# Patient Record
Sex: MALE | Race: WHITE | NOT HISPANIC OR LATINO | Employment: FULL TIME | URBAN - METROPOLITAN AREA
[De-identification: names, ages, dates, MRNs, and addresses within clinical notes are randomized per-mention and may not be internally consistent; named-entity substitution may affect disease eponyms.]

---

## 2019-07-21 ENCOUNTER — OFFICE VISIT (OUTPATIENT)
Dept: URGENT CARE | Facility: CLINIC | Age: 49
End: 2019-07-21
Payer: COMMERCIAL

## 2019-07-21 ENCOUNTER — HOSPITAL ENCOUNTER (EMERGENCY)
Facility: MEDICAL CENTER | Age: 49
End: 2019-07-21
Attending: EMERGENCY MEDICINE
Payer: COMMERCIAL

## 2019-07-21 VITALS
OXYGEN SATURATION: 97 % | RESPIRATION RATE: 16 BRPM | TEMPERATURE: 97 F | BODY MASS INDEX: 24.55 KG/M2 | SYSTOLIC BLOOD PRESSURE: 114 MMHG | HEART RATE: 65 BPM | HEIGHT: 68 IN | WEIGHT: 162 LBS | DIASTOLIC BLOOD PRESSURE: 84 MMHG

## 2019-07-21 VITALS
DIASTOLIC BLOOD PRESSURE: 74 MMHG | HEIGHT: 68 IN | WEIGHT: 163.58 LBS | OXYGEN SATURATION: 98 % | TEMPERATURE: 98.3 F | BODY MASS INDEX: 24.79 KG/M2 | SYSTOLIC BLOOD PRESSURE: 126 MMHG | HEART RATE: 57 BPM | RESPIRATION RATE: 18 BRPM

## 2019-07-21 DIAGNOSIS — S05.01XA ABRASION OF RIGHT CORNEA, INITIAL ENCOUNTER: ICD-10-CM

## 2019-07-21 PROCEDURE — 99203 OFFICE O/P NEW LOW 30 MIN: CPT | Performed by: PHYSICIAN ASSISTANT

## 2019-07-21 PROCEDURE — 99283 EMERGENCY DEPT VISIT LOW MDM: CPT

## 2019-07-21 PROCEDURE — 99284 EMERGENCY DEPT VISIT MOD MDM: CPT

## 2019-07-21 PROCEDURE — 700101 HCHG RX REV CODE 250: Performed by: EMERGENCY MEDICINE

## 2019-07-21 RX ORDER — ERYTHROMYCIN 5 MG/G
OINTMENT OPHTHALMIC ONCE
Status: COMPLETED | OUTPATIENT
Start: 2019-07-21 | End: 2019-07-21

## 2019-07-21 RX ORDER — PROPARACAINE HYDROCHLORIDE 5 MG/ML
1 SOLUTION/ DROPS OPHTHALMIC ONCE
Status: COMPLETED | OUTPATIENT
Start: 2019-07-21 | End: 2019-07-21

## 2019-07-21 RX ORDER — TETRACAINE HYDROCHLORIDE 5 MG/ML
2 SOLUTION OPHTHALMIC ONCE
Status: DISCONTINUED | OUTPATIENT
Start: 2019-07-21 | End: 2019-07-21

## 2019-07-21 RX ADMIN — FLUORESCEIN SODIUM 0.6 MG: 0.6 STRIP OPHTHALMIC at 15:00

## 2019-07-21 RX ADMIN — PROPARACAINE HYDROCHLORIDE 1 DROP: 5 SOLUTION/ DROPS OPHTHALMIC at 15:15

## 2019-07-21 RX ADMIN — ERYTHROMYCIN: 5 OINTMENT OPHTHALMIC at 15:30

## 2019-07-21 ASSESSMENT — ENCOUNTER SYMPTOMS
BLURRED VISION: 0
DOUBLE VISION: 0
PHOTOPHOBIA: 1
HEADACHES: 0
SORE THROAT: 0
MYALGIAS: 0
COUGH: 0
FOREIGN BODY SENSATION: 1
EYE DISCHARGE: 1
EYE REDNESS: 1
SHORTNESS OF BREATH: 0
EYE ITCHING: 1
VOMITING: 0
NAUSEA: 0
ABDOMINAL PAIN: 0
FEVER: 0

## 2019-07-21 NOTE — PROGRESS NOTES
Subjective:      Cj De Leon is a 48 y.o. male who presents with Eye Problem (thinks he has a piece of wood in eye,sensitive to light,painful,drainage-(R)-happened yesterday morning)        Eye Problem    The right eye is affected. This is a new problem. The current episode started yesterday. The problem occurs constantly. The problem has been gradually worsening. The injury mechanism was a foreign body. The pain is moderate. He does not wear contacts. Associated symptoms include an eye discharge (watery tears), eye redness, a foreign body sensation, itching and photophobia. Pertinent negatives include no blurred vision, double vision, fever, nausea, recent URI or vomiting. He has tried eye drops for the symptoms.     The patient presents to clinic c/o foreign body to right eye. The patient states he was chopping wood yesterday, when he may have gotten a splinter in his right eye. The patient reports associated eye pain and redness. He also reports watery discharge. The patient denies vision changes and fever. The patient has irrigated his eye without relief of the foreign body sensation.     PMH:  has no past medical history on file.  MEDS: No current outpatient prescriptions on file.  ALLERGIES: No Known Allergies  SURGHX: No past surgical history on file.  SOCHX:  reports that he has never smoked. He has never used smokeless tobacco.  FH: Family history was reviewed, no pertinent findings to report      Review of Systems   Constitutional: Negative for fever.   HENT: Negative for congestion, ear pain and sore throat.    Eyes: Positive for photophobia, discharge (watery tears), redness and itching. Negative for blurred vision and double vision.   Respiratory: Negative for cough and shortness of breath.    Cardiovascular: Negative for chest pain and leg swelling.   Gastrointestinal: Negative for abdominal pain, nausea and vomiting.   Musculoskeletal: Negative for joint pain and myalgias.   Skin: Negative for  "rash.   Neurological: Negative for headaches.   All other systems reviewed and are negative.         Objective:     /84 (BP Location: Left arm)   Pulse 65   Temp 36.1 °C (97 °F) (Temporal)   Resp 16   Ht 1.727 m (5' 8\")   Wt 73.5 kg (162 lb)   SpO2 97%   BMI 24.63 kg/m²      Physical Exam   Constitutional: He is oriented to person, place, and time. He appears well-developed and well-nourished. No distress.   HENT:   Head: Normocephalic and atraumatic.   Right Ear: External ear normal.   Left Ear: External ear normal.   Nose: Nose normal.   Eyes: Pupils are equal, round, and reactive to light. EOM are normal. Right eye exhibits no discharge. Right conjunctiva is injected.       Right Eye:  Examined the patient's right eye with fluorescein staining.  A large amount of fluorescein uptake was visualized to the 12 o'clock and 6 o'clock positions of the right eye, as well as the 12 o'clock position of the iris.  The patient's right eye is injected.    Neck: Normal range of motion. Neck supple.   Cardiovascular: Normal rate.    Pulmonary/Chest: Effort normal.   Musculoskeletal: Normal range of motion.   Patient moves all 4 extremities   Neurological: He is alert and oriented to person, place, and time.   Skin: Skin is warm and dry.          Progress:  Examined the patient's right eye with fluorescein staining.  A large amount of fluorescein uptake was visualized to the 12 o'clock and 6 o'clock positions of the right eye, as well as the 12 o'clock position of the iris.  Explained to the patient that his findings are consistent with multiple corneal abrasions and the need for ophthalmic antibitoics.  The patient is concerned about possible retained foreign body.  No foreign body was visualized on exam.  The patient continues to express his concern for possible foreign body, and is requesting further imaging/evaluation of his right eye.       Assessment/Plan:     1. Abrasion of right cornea, initial " encounter  The patient's presenting symptoms and physical exam are consistent with right eye pain and possible foreign body.  On physical exam, the patient had a large amount of fluorescein uptake to the 12 o'clock and 6 o'clock positions of the right eye, as well as the 12 o'clock position of the iris, consistent with multiple corneal abrasions. The patient is concerned about possible retained foreign body.  No foreign body was visualized on exam.  The patient continues to express his concern for possible foreign body, and is requesting further imaging/evaluation of his right eye.  Recommend the patient be transferred to the [facility] ED for further evaluation of his symptoms.  The patient agrees with this plan.  The patient is stable for transfer via private vehicle at this time.    Plan:  Transfer patient to the [facility] ED for further evaluation and treatment.

## 2019-07-21 NOTE — ED TRIAGE NOTES
[facility]  Chief Complaint   Patient presents with   • Sent from Urgent Care   • Foreign Body in Eye     piece of wood     Pt ambulatory to triage with above complaint. VSS.    Was using a table saw and piece of wood went into pt's right eye.  Redness and watery discharge noted.   Pt returned to lobby, educated on triage process, and to inform staff of any changes or concerns.

## 2019-07-21 NOTE — DISCHARGE INSTRUCTIONS
Please use erythromycin ointment 4 times daily in the right eye for the next 3 to 4 days or until resolution of pain

## 2019-07-21 NOTE — ED PROVIDER NOTES
"ED Provider Note    Scribed for Ignacio Mandel M.D. by Stephany Medina. 7/21/2019  2:44 PM    Primary care provider: None  Means of arrival: Walk-in  History obtained from: Patient  History limited by: None    CHIEF COMPLAINT  Chief Complaint   Patient presents with   • Sent from Urgent Care   • Foreign Body in Eye     piece of wood       DALIA De Leon is a 48 y.o. male who presents to the Emergency Department complaining of foreign body to his right eye onset 1 day ago. Per patient, he was working with a skill saw last night at 8 PM without safety glasses when on his second cut, the wood shattered and pieces hit his face. He states he did not feel pain immediately and went to put on safety glasses but started to have \"exploding pain\" with copious tearing a couple minutes later. He denies any associated fevers. The patient reports he does not have any allergies to medications.      REVIEW OF SYSTEMS  Pertinent negatives include no fever.      PAST MEDICAL HISTORY   No pertinent past medical history noted.     SURGICAL HISTORY   has a past surgical history that includes lipoma excision.    SOCIAL HISTORY  Social History   Substance Use Topics   • Smoking status: Never Smoker   • Smokeless tobacco: Never Used   • Alcohol use No      History   Drug Use No       FAMILY HISTORY  History reviewed. No pertinent family history.    ALLERGIES  No Known Allergies    PHYSICAL EXAM  VITAL SIGNS: /90   Pulse (!) 55   Temp 36.8 °C (98.3 °F) (Oral)   Resp 16   Ht 1.727 m (5' 8\")   Wt 74.2 kg (163 lb 9.3 oz)   SpO2 97%   BMI 24.87 kg/m²   Constitutional: Well developed, Well nourished, No acute distress, Non-toxic appearance.   Eyes: Slit-lamp examination was performed after proparacaine instillation in staining of the cornea with fluorescein.  Peripheral corneal abrasion about 1.2 mm at 1 o'clock position of right cornea, no foreign body. Negative roberto carlos sign.  No evidence of proptosis, anterior chamber cells or " flare    COURSE & MEDICAL DECISION MAKING  Nursing notes, VS, PMSFHx reviewed in chart.    2:44 PM - Patient seen and examined at bedside. Patient was treated with erythromycin ophthalmic ointment, Opthaine 0.5 % 1 drop, and Ful-Dawna ophthalmic strip 0.6 mg for his symptoms.  Discussed with patient that he has an abrasion to his cornea but there are no foreign bodies present upon my eye exam. His current pain should decrease over the next 3 days and resolve. There is low change for infection but if patient's pain increases or other concerning symptoms show up, return to ED. Discussed discharged plans as outlined below. Patient understands and is comfortable with plan.    Patient has had high blood pressure while in the emergency department, felt likely secondary to medical condition. Counseled patient to monitor blood pressure at home and follow up with primary care physician.     The patient will return for new or worsening symptoms and is stable at the time of discharge.    DISPOSITION:  Patient will be discharged home in stable condition.  He is given for his dose of erythromycin ophthalmologic ointment and instructed on 4 times per day usage for the next 3 days or until pain resolves.  He will follow-up with ophthalmology if no improvement of pain in the next 3 days    FINAL IMPRESSION  1. Abrasion of right cornea, initial encounter    Slit-lamp examination by ERP     Stephany ORTEGA (Scribe), am scribing for, and in the presence of, Ignacio Mandel M.D..    Electronically signed by: Stephany Medina (Scribe), 7/21/2019    Ignacio ORTEGA M.D. personally performed the services described in this documentation, as scribed by Stephany Medina in my presence, and it is both accurate and complete.    E    The note accurately reflects work and decisions made by me.  Ignacio Mandel  7/21/2019  3:10 PM